# Patient Record
Sex: FEMALE | Race: BLACK OR AFRICAN AMERICAN | NOT HISPANIC OR LATINO | Employment: STUDENT | URBAN - METROPOLITAN AREA
[De-identification: names, ages, dates, MRNs, and addresses within clinical notes are randomized per-mention and may not be internally consistent; named-entity substitution may affect disease eponyms.]

---

## 2023-06-12 DIAGNOSIS — N39.498 OTHER SPECIFIED URINARY INCONTINENCE: Primary | ICD-10-CM

## 2023-06-19 ENCOUNTER — CLINICAL SUPPORT (OUTPATIENT)
Dept: REHABILITATION | Facility: OTHER | Age: 28
End: 2023-06-19
Attending: OBSTETRICS & GYNECOLOGY
Payer: OTHER GOVERNMENT

## 2023-06-19 DIAGNOSIS — N39.498 OTHER SPECIFIED URINARY INCONTINENCE: ICD-10-CM

## 2023-06-19 DIAGNOSIS — R27.8 COORDINATION IMPAIRMENT: ICD-10-CM

## 2023-06-19 DIAGNOSIS — R10.30 LOWER ABDOMINAL PAIN: ICD-10-CM

## 2023-06-19 DIAGNOSIS — M62.89 PELVIC FLOOR DYSFUNCTION: ICD-10-CM

## 2023-06-19 DIAGNOSIS — R10.2 PELVIC PAIN: ICD-10-CM

## 2023-06-19 PROCEDURE — 97140 MANUAL THERAPY 1/> REGIONS: CPT

## 2023-06-19 PROCEDURE — 97112 NEUROMUSCULAR REEDUCATION: CPT

## 2023-06-19 PROCEDURE — 97530 THERAPEUTIC ACTIVITIES: CPT

## 2023-06-19 PROCEDURE — 97162 PT EVAL MOD COMPLEX 30 MIN: CPT

## 2023-06-27 PROBLEM — R27.8 COORDINATION IMPAIRMENT: Status: ACTIVE | Noted: 2023-06-27

## 2023-06-27 PROBLEM — M62.89 PELVIC FLOOR DYSFUNCTION: Status: ACTIVE | Noted: 2023-06-27

## 2023-06-27 PROBLEM — R10.2 PELVIC PAIN: Status: ACTIVE | Noted: 2023-06-27

## 2023-06-27 PROBLEM — R10.30 LOWER ABDOMINAL PAIN: Status: ACTIVE | Noted: 2023-06-27

## 2023-06-27 NOTE — PATIENT INSTRUCTIONS
Home Exercise Program: 06/27/2023     SCAR MOBILIZATION  - Gently massage your scar in all directions both superficially along the skin and deeply.   - Massage the area AROUND your scar, as well as directly on it.   - Do not pull your scar apart with both hands - Use one hand/finger to anchor and use the other to pull along or across the scar.  - Apply only as much pressure as you can tolerate, so that you can do this again the next day.     Do for 5 minutes every day.          Inhale: Repeat 2 minutes, 2 times per day ; hold for now   Exhale: hold for now

## 2023-06-27 NOTE — PLAN OF CARE
[facility]  Pelvic Health Physical Therapy Initial Evaluation    Date: 2023   Name: Owen Rock  Clinic Number: [id number]  Therapy Diagnosis:   Encounter Diagnoses   Name Primary?    Other specified urinary incontinence     Pelvic floor dysfunction     Lower abdominal pain     Pelvic pain     Coordination impairment      Physician: Anette Alcocer MD    Physician Orders: PT Eval and Treat - Pelvic Floor PT   Medical Diagnosis from Referral: N39.498 (ICD-10-CM) - Other specified urinary incontinence  Evaluation Date: 2023  Authorization Period Expiration: 2023  Plan of Care Expiration: 2023  Visit # / Visits authorized:     Time In: 1605  Time Out: 1715  Total Appointment Time (timed & untimed codes): 70 minutes    Precautions: universal    Subjective     Date of onset:     History of current condition - Owen reports: PER with coughing, laughing, sneezing. She also feels like she is constantly leaking with movement. Often feels with standing up, sitting, and walking. Occasionally also experiences UUI, but only when she is busy and has held urine for a while.   Has experienced pain with intercourse since delivery of her son. Had an emergency  on 2021. Experiences pain most with deeper penetration when she is laying on her back and when she is laying on her stomach unless she supports herself with a pillow.   Has sporadic issues with constipation since son's delivery. Has had issues with hemorrhoids; most recent were in January. Was resolved with fiber use, though she is no longer taking.     OB/GYN History:  Caesarean  Sexually active? Yes  Pain with vaginal exams, intercourse or tampon use? Yes    Bladder/Bowel History:   Frequency of urination:   Daytime: varies every 15 minutes - every 4 hours  Nighttime: 0-1  Difficulty initiating urine stream: Yes  Urine stream: consistent   Complete emptying: Yes  Bladder leakage: Yes  Frequency of  incidents: daily   Amount leaked (urine): varies   Urinary Urgency: Yes  Able to delay the urge for at least 30  minute(s).  Frequency of bowel movements: once a day  Difficulty initiating BM: Yes  Quality/Shape of BM: [place] Stool Chart Type 2-5   Does Patient Feel Empty after BM? Most often   Fiber Supplements or Laxative Use?  Not regularly  Colon leakage: No  Form of protection: pads  Number of pads required in 24 hours: 0-1    PAIN:  Location: vaginal   Current 0/10, worst 4/10, best 0/10   Description: Deep and Sharp  Aggravating Factors/Activities that cause symptoms: intercourse    Easing Factors: rest     Medical History: Owen  has no past medical history on file.     Surgical History:  Owen Rock  has a past surgical history that includes right ear surgery and External ear surgery (2011).    Medications: Owen has a current medication list which includes the following prescription(s): fluticasone propionate and medroxyprogesterone.    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  Prior Level of Function: independent   Current Level of Function: leakage interrupts daily activities     Types of fluid intake: 32 oz of water, daily coffee, daily bottle of sprite   Diet: no specifics provided    Fiber: irregular supplementation   Habitus:well developed, well nourished  Abuse/Neglect: No     Pts goals: to enjoy sex, improve bladder control     OBJECTIVE     See EMR under MEDIA for written consent provided 6/19/2023  Chaperone: declined    ORTHO SCREEN  Posture in sitting: slouched  and sacral sitting  Posture in standing: forward and rounded shoulders , increased  kyphosis, and neutral pelvis   Pelvic alignment: no sign of deviations noted in supine   SI Joint Palpation: Denies tenderness to SI joint palpation bilaterally.    BACK ROM: WFL      Special Tests for Load Transfer Assessment Between the Trunk and Lower Extremities:    Active Straight Leg Test:    Right: pelvic rotation or drop    Left: pelvic rotation or drop    Active Straight Leg Test with Overpressure:   Right: breath holding and pelvic rotation or drop    Left: breath holding and pelvic rotation or drop    Single Leg Stance Test:  WNL bilaterally     ABDOMINAL WALL ASSESSMENT  Palpation: tender and increased tension   Abdominal strength: Rectus abdominus: 4+/5     Transverse abdominus: 4/5  Scarring: transverse lower abdominal scarring - hypersensitive, limited mobility left>right   Pelvic Floor Muscle and Transverse Abdominus Synergy: present  Diastasis: absent     BREATHING MECHANICS ASSESSMENT   Thorax Assessment During Quiet Respiration: WNL excursion of ribcage and Decreased excursion of abdominal wall   Thorax Assessment During Deep Respiration: Decreased excursion of abdominal wall  and Excessive excursion of sternum    VAGINAL PELVIC FLOOR EXAM - NP this session    Limitation/Restriction for FOTO Pelvic Floor Survey    Therapist reviewed FOTO scores for Owen Rock on 2023.   FOTO documents entered into [employer] - see Media section.    Limitation Score:          TREATMENT     Treatment Time In: 1635  Treatment Time Out: 1715  Total Treatment time (time-based codes) separate from Evaluation: 40 minutes    Neuromuscular Re-education to develop Coordination, Control, and Down training for 10 minutes including: pelvic floor relaxation/bulging training, 360 breathing , and diaphragmatic breathing    Manual Therapy to develop extensibility and desensitization for 10 minutes including: scar mobilization of  scarring    Therapeutic Activity Patient participated in dynamic functional therapeutic  activities to improve functional performance for 20 minutes. Including: Education as described below.     Patient Education provided:   general anatomy/physiology of urinary/ bowel  system, benefits of treatment, risks of treatment, and alternative methods of treatment were discussed with the pt. Additionally, bladder irritants, anatomy/physiology of pelvic floor, posture/body mechanices, bladder retraining, diaphragmatic breathing, fluid intake/dietary modifications, and behavior modifications were reviewed.     Home Exercises Provided:  Written Home Exercises Provided: yes.  Exercises were reviewed and Owen was able to demonstrate them prior to the end of the session.    Owen demonstrated good  understanding of the education provided.     See EMR under Patient Instructions for exercises provided 6/19/2023.    Assessment     Owen is a 28 y.o. female referred to outpatient Physical Therapy with a medical diagnosis of urinary incontinence. Pt presents with fair posture and trunk mobility, though most limitation noted in abdominopelvic region. Owen displays major restriction of caesarian scar mobility as well as significant hypersensitivity. Based on reproduced pain, myofascial restriction is likely contributing to feelings of bladder pain and urgency when filling. Pelvic floor muscles not fully assessed this session, but based on reported symptoms and observations at abdominal wall, significant limitations in abdominopelvic muscle balance, coordination, and control are likely contributing to poor pressure management and bladder dysfunction. Based on presentation, Owen would benefit from continued pelvic floor physical therapy for improved abdominopelvic coordination.    Pt prognosis is Good.   Pt will benefit from skilled outpatient Physical Therapy to address the deficits stated above and in the chart below, provide pt/family education, and to maximize pt's level of independence.     Plan of care discussed  with patient: Yes  Pt's spiritual, cultural and educational needs considered and patient is agreeable to the plan of care and goals as stated below:     Anticipated Barriers for therapy: scheduling limitations, chronicity of condition     Medical Necessity is demonstrated by the following    History  Co-morbidities and personal factors that may impact the plan of care Co-morbidities:   None on file     Personal Factors:   lifestyle     moderate   Examination  Body Structures and Functions, activity limitations and participation restrictions that may impact the plan of care Body Regions/Systems/Functions:  altered posture, poor knowledge of body mechanics and posture, adhered abdominal scar, poor trunk stability, pelvic floor tenderness, decreased phasic ability of the pelvic muscles, poor quality of pelvic muscle contraction, poor coordination of pelvic floor muscles during ADL's leading to urinary or fecal leakage, dysfunctional voiding, and dysfunctional defecation     Activity Limitations:  urgency , initiating a BM, Initiating urine stream, full bladder emptying, incontinence with ADLs, and Pain with ADLs    Participation Restrictions:  all ADLs/iADLs uninterrupted by urinary incontinence/urgency/frequency, relationship with spouse/partner, ADL participation affected by pain, work duties, and exercise restrictions due to incontinence     Activity limitations:   Learning and applying knowledge  no deficits    General Tasks and Commands  no deficits    Communication  no deficits    Mobility  no deficits    Self care  toileting    Domestic Life  no deficits    Interactions/Relationships  intimate relationships    Life Areas  employment    Community and Social Life  community life  recreation and leisure       moderate   Clinical Presentation evolving clinical presentation with changing clinical characteristics moderate   Decision Making/ Complexity Score: moderate       Goals:  Short Term Goals: 6 weeks   Pt to be  edu pelvic muscle bracing and be able to consistently perform correctly and quickly to help decrease incontinence with cough/laugh/sneeze.  Pt to demonstrate being able to correctly and consistently perform a kegel which is needed  to increase pelvic floor muscle coordination and strength needed for continence.  Pt to report being able to transfer from sitting to standing without leakage of urine.  Pt to voice understanding of the role that diet plays on urinary urgency.    Pt to demonstrate proper diaphragmatic breathing to help with calming the nervous system in order to decrease pain and to improve abdominal wall musculature extensibility.   Pt to report being able to complete a half day at work without significant increase in pain to demonstrate a return towards prior level of function.  Pt to report minimal pain with palpation of abdominal wall due to improvement in soft tissue mobility from moderate to minimal restrictions.  Pt to demonstrate a decrease in scar restrictions to no more than minimal restrictions needed to help decrease pain with functional mobility.  Pt will report minimal to no pain with single digit pelvic assessment and display relaxation demonstrating improving pelvic floor muscle relaxation and coordination.     Long Term Goals: 12 weeks   Pt to be discharged with home plan for carry over after discharge.    Pt to report elimination of incontinence with ADLs to demonstrate improved pelvic floor muscle strength and coordination.  Pt will be trained and compliant with postural strategies in sitting and standing to improve alignment and decrease pain and muscle fatigue  Pt to report being able to complete a full day at work without significant increase in pain to demonstrate a return towards prior level of function.  Pt to report being able to have a comfortable vaginal exam without significant increase in pelvic pain.  Pt to increase abdominal wall strength by at least 1 muscle grade to  improve lumbopelvic stability with ADLs that would normally increase pain.  Pt to report a decrease in pain to no more than 1/10 at it's worst with vaginal intercourse.    Pt to report being able to sneeze without incontinence demonstrating improved pelvic floor strength and coordination to improve confidence in social situations.   Pt to report being able to have a spontaneous bowel movement at least once every 2 days to demonstrate improving gut motility and pelvic floor coordination.       Plan     Plan of care Certification: 6/19/2023 to 9/19/2023.    Outpatient Physical Therapy 1 times weekly for 12 weeks to include the following interventions: therapeutic exercises, therapeutic activity, neuromuscular re-education, manual therapy, modalities PRN, patient/family education and self care/home management    Plan for next visit: pelvic floor muscle assessment     ELIZABETH GARCIA, PT  6/19/2023        I have seen the patient, reviewed the therapist's plan of care, and I agree with the plan of care.      I certify the need for these services furnished under this plan of treatment and while under my care.    ___________________ ________ Physician/Referring Practitioner            ___________________________ Date of Signature

## 2023-08-01 ENCOUNTER — CLINICAL SUPPORT (OUTPATIENT)
Dept: REHABILITATION | Facility: OTHER | Age: 28
End: 2023-08-01
Payer: OTHER GOVERNMENT

## 2023-08-01 DIAGNOSIS — R10.2 PELVIC PAIN: ICD-10-CM

## 2023-08-01 DIAGNOSIS — R10.30 LOWER ABDOMINAL PAIN: ICD-10-CM

## 2023-08-01 DIAGNOSIS — R27.8 COORDINATION IMPAIRMENT: ICD-10-CM

## 2023-08-01 DIAGNOSIS — M62.89 PELVIC FLOOR DYSFUNCTION: Primary | ICD-10-CM

## 2023-08-01 PROCEDURE — 97140 MANUAL THERAPY 1/> REGIONS: CPT

## 2023-08-01 PROCEDURE — 97530 THERAPEUTIC ACTIVITIES: CPT

## 2023-08-01 PROCEDURE — 97112 NEUROMUSCULAR REEDUCATION: CPT

## 2023-08-01 NOTE — PROGRESS NOTES
Pelvic Health Physical Therapy   Treatment Note     Name: Owen Rock [facility] Number: [id number]    Therapy Diagnosis:   Encounter Diagnoses   Name Primary?    Pelvic floor dysfunction Yes    Lower abdominal pain     Pelvic pain     Coordination impairment      Physician: Anette Alcocer MD    Visit Date: 8/1/2023    Physician Orders: PT Eval and Treat - Pelvic Floor PT   Medical Diagnosis from Referral: N39.498 (ICD-10-CM) - Other specified urinary incontinence  Evaluation Date: 6/19/2023  Authorization Period Expiration: 9/26/2023  Plan of Care Expiration: 9/19/2023  Visit # / Visits authorized: 1/ 14    Cancelled Visits: 2  No Show Visits: 0    Time In: 1615  Time Out: 1715  Total Billable Time: 60 minutes    Precautions: Standard    Subjective     Pt reports: has been performing scar massage in the shower. Noticed increased sensitivity today as well as increased leakage throughout the day.    She was compliant with home exercise program.  Response to previous treatment: Good  Functional change: Ongoing     Pain: 5/10  Location:  lower abdomen      Objective     FOTO 1/3 - Most recently administered 6/19/2023    Owen received therapeutic exercises to develop  strength, endurance, ROM, flexibility, posture, and core stabilization for 00 minutes including:     Owen received the following manual therapy techniques: to develop flexibility, extensibility, and desensitization for 40 minutes including: trigger point/myofascial release of Abdominal aponeurosis, endopelvic fascia, abdominals, soft tissue mobilization of lower abdomen, and scar mobilization of caesarean section scar      Owen participated in neuromuscular re-education activities to develop Coordination, Control, and Down training for 10 minutes including: pelvic floor relaxation/bulging training and 360 breathing     Diaphragmatic breathing review   + pelvic floor muscle, glute, abdominal relaxation     Owen participated in  dynamic functional therapeutic activities to improve functional performance for 10 minutes, including:    Implementation, positioning for scar massage       Home Exercises Provided and Patient Education Provided     Education provided:   - anatomy/physiology of pelvic floor, posture/body mechanices, diaphragmatic breathing, and behavior modifications  Discussed progression of plan of care with patient; educated pt in activity modification; reviewed HEP with pt. Pt demonstrated and verbalized understanding of all instruction and was provided with a handout of HEP (see Patient Instructions).    Written Home Exercises Provided: Patient instructed to cont prior HEP.  Exercises were reviewed and Owen was able to demonstrate them prior to the end of the session.  Owen demonstrated good  understanding of the education provided.     See EMR under Patient Instructions for exercises provided 8/1/2023.    Assessment     Abdominal mobilization continued this session, as Owen presented with increased sensitivity. Upon superior mobilization, referral of tension and tenderness to pubic region and bilateral labia noted. Based on referral, more distal mobilization initiated this session. Significant improvement noted at completion of session, with reduction of pain from 5/10 pretreatment  to 1/10 post. Based on performance, Owen would benefit from continued regular performance.     Owen Is progressing well towards her goals.   Pt prognosis is Good.     Pt will continue to benefit from skilled outpatient physical therapy to address the deficits listed in the problem list box on initial evaluation, provide pt/family education and to maximize pt's level of independence in the home and community environment.     Pt's spiritual, cultural and educational needs considered and pt agreeable to plan of care and goals.     Anticipated barriers to physical therapy: scheduling limitations, chronicity of condition    Goals:   Short Term  Goals: 6 weeks - progressing  Pt to be edu pelvic muscle bracing and be able to consistently perform correctly and quickly to help decrease incontinence with cough/laugh/sneeze.  Pt to demonstrate being able to correctly and consistently perform a kegel which is needed  to increase pelvic floor muscle coordination and strength needed for continence.  Pt to report being able to transfer from sitting to standing without leakage of urine.  Pt to voice understanding of the role that diet plays on urinary urgency.    Pt to demonstrate proper diaphragmatic breathing to help with calming the nervous system in order to decrease pain and to improve abdominal wall musculature extensibility.   Pt to report being able to complete a half day at work without significant increase in pain to demonstrate a return towards prior level of function.  Pt to report minimal pain with palpation of abdominal wall due to improvement in soft tissue mobility from moderate to minimal restrictions.  Pt to demonstrate a decrease in scar restrictions to no more than minimal restrictions needed to help decrease pain with functional mobility.  Pt will report minimal to no pain with single digit pelvic assessment and display relaxation demonstrating improving pelvic floor muscle relaxation and coordination.      Long Term Goals: 12 weeks - progressing  Pt to be discharged with home plan for carry over after discharge.    Pt to report elimination of incontinence with ADLs to demonstrate improved pelvic floor muscle strength and coordination.  Pt will be trained and compliant with postural strategies in sitting and standing to improve alignment and decrease pain and muscle fatigue  Pt to report being able to complete a full day at work without significant increase in pain to demonstrate a return towards prior level of function.  Pt to report being able to have a comfortable vaginal exam without significant increase in pelvic pain.  Pt to increase  abdominal wall strength by at least 1 muscle grade to improve lumbopelvic stability with ADLs that would normally increase pain.  Pt to report a decrease in pain to no more than 1/10 at it's worst with vaginal intercourse.    Pt to report being able to sneeze without incontinence demonstrating improved pelvic floor strength and coordination to improve confidence in social situations.   Pt to report being able to have a spontaneous bowel movement at least once every 2 days to demonstrate improving gut motility and pelvic floor coordination.     Plan     Plan for next visit: abdominal MFR, pelvic floor muscle assessment     ELIZABETH GARCIA, PT   08/02/2023

## 2023-08-16 ENCOUNTER — CLINICAL SUPPORT (OUTPATIENT)
Dept: REHABILITATION | Facility: OTHER | Age: 28
End: 2023-08-16
Payer: OTHER GOVERNMENT

## 2023-08-16 DIAGNOSIS — R27.8 COORDINATION IMPAIRMENT: ICD-10-CM

## 2023-08-16 DIAGNOSIS — R10.30 LOWER ABDOMINAL PAIN: ICD-10-CM

## 2023-08-16 DIAGNOSIS — R10.2 PELVIC PAIN: ICD-10-CM

## 2023-08-16 DIAGNOSIS — M62.89 PELVIC FLOOR DYSFUNCTION: Primary | ICD-10-CM

## 2023-08-16 PROCEDURE — 97112 NEUROMUSCULAR REEDUCATION: CPT

## 2023-08-16 PROCEDURE — 97140 MANUAL THERAPY 1/> REGIONS: CPT

## 2023-08-16 NOTE — PROGRESS NOTES
Pelvic Health Physical Therapy   Treatment Note     Name: Owen FLORES  Clinic Number: [id number]    Therapy Diagnosis:   Encounter Diagnoses   Name Primary?    Pelvic floor dysfunction Yes    Lower abdominal pain     Pelvic pain     Coordination impairment        Physician: Anette Alcocer MD    Visit Date: 8/16/2023    Physician Orders: PT Eval and Treat - Pelvic Floor PT   Medical Diagnosis from Referral: N39.498 (ICD-10-CM) - Other specified urinary incontinence  Evaluation Date: 6/19/2023  Authorization Period Expiration: 9/26/2023  Plan of Care Expiration: 9/19/2023  Visit # / Visits authorized: 2/ 14    Cancelled Visits: 4  No Show Visits: 0    Time In: 1300  Time Out: 1400  Total Billable Time: 60 minutes    Precautions: Standard    Subjective     Pt reports: symptoms remain approximately the same, though scar has been feelings slightly less sensitive    She was compliant with home exercise program.  Response to previous treatment: Good  Functional change: decreased scar sensitivity     Pain: 0/10  Location:  lower abdomen      Objective     FOTO 1/3 - Most recently administered 6/19/2023    VAGINAL PELVIC FLOOR EXAM    EXTERNAL ASSESSMENT  Introitus: WNL  Skin condition: WNL  Scarring: none   Sensation: WNL   Pain:  none  Voluntary contraction: visible lift  Voluntary relaxation: visible drop  Involuntary contraction: reflex tightening        INTERNAL ASSESSMENT  Pain: tender areas noted as follows: posterior pelvic floor, periurethral tissue    Sensation: able to localized pressure appropriately   Vaginal vault: WNL   Muscle Bulk: slightly increased tone  Muscle Power: 2/5   Muscle Endurance: >3 sec  # Reps To Fatigue: 2 due to incomplete relaxation     Fast Contractions in 10 seconds: 3     Quality of contraction: slow rise and incomplete relaxation   Specificity: patient contracts: abdominals    Coordination: cannot isolate PFM from abdominals       Owen received therapeutic exercises  to develop  strength, endurance, ROM, flexibility, posture, and core stabilization for 00 minutes including:     Owen received the following manual therapy techniques: to develop flexibility, extensibility, and desensitization for 15 minutes including: trigger point/myofascial release of Abdominal aponeurosis, endopelvic fascia, abdominals, soft tissue mobilization of lower abdomen, and scar mobilization of caesarean section scar    Caesarean section scar massage, MFR lower abdominal wall     MFR pelvic floor muscle - internal     Owen participated in neuromuscular re-education activities to develop Coordination, Control, and Down training for 40 minutes including: pelvic floor relaxation/bulging training and 360 breathing     Diaphragmatic breathing review + pelvic floor muscle mobility    + glute, adductor co-contraction     Pelvic floor muscle elevators    Emphasis on complete relaxation     Owen participated in dynamic functional therapeutic activities to improve functional performance for 5 minutes, including:    Urge delay techniques     Home Exercises Provided and Patient Education Provided     Education provided:   - anatomy/physiology of pelvic floor, posture/body mechanices, diaphragmatic breathing, and behavior modifications  Discussed progression of plan of care with patient; educated pt in activity modification; reviewed HEP with pt. Pt demonstrated and verbalized understanding of all instruction and was provided with a handout of HEP (see Patient Instructions).    Written Home Exercises Provided: Patient instructed to cont prior HEP.  Exercises were reviewed and Owen was able to demonstrate them prior to the end of the session.  Owen demonstrated good  understanding of the education provided.     See EMR under Patient Instructions for exercises provided 8/1/2023.    Assessment     Pelvic floor muscle assessment performed this session, with Owen demonstrating slight increase in pelvic floor  muscle tension and limited mobility. Significant weakness noted, though likely influenced by increased resting tension and incomplete relaxation. Limited coordination with abdominals noted, though fair coordination with hips demonstrated. Based on performance, home exercise program updated to include pelvic floor muscle elevators with increased focus on relaxation. Plan to reassess utility next visit.    Owen Is progressing well towards her goals.   Pt prognosis is Good.     Pt will continue to benefit from skilled outpatient physical therapy to address the deficits listed in the problem list box on initial evaluation, provide pt/family education and to maximize pt's level of independence in the home and community environment.     Pt's spiritual, cultural and educational needs considered and pt agreeable to plan of care and goals.     Anticipated barriers to physical therapy: scheduling limitations, chronicity of condition    Goals:   Short Term Goals: 6 weeks - progressing  Pt to be edu pelvic muscle bracing and be able to consistently perform correctly and quickly to help decrease incontinence with cough/laugh/sneeze.  Pt to demonstrate being able to correctly and consistently perform a kegel which is needed  to increase pelvic floor muscle coordination and strength needed for continence.  Pt to report being able to transfer from sitting to standing without leakage of urine.  Pt to voice understanding of the role that diet plays on urinary urgency.    Pt to demonstrate proper diaphragmatic breathing to help with calming the nervous system in order to decrease pain and to improve abdominal wall musculature extensibility.   Pt to report being able to complete a half day at work without significant increase in pain to demonstrate a return towards prior level of function.  Pt to report minimal pain with palpation of abdominal wall due to improvement in soft tissue mobility from moderate to minimal restrictions.  Pt  to demonstrate a decrease in scar restrictions to no more than minimal restrictions needed to help decrease pain with functional mobility.  Pt will report minimal to no pain with single digit pelvic assessment and display relaxation demonstrating improving pelvic floor muscle relaxation and coordination.      Long Term Goals: 12 weeks - progressing  Pt to be discharged with home plan for carry over after discharge.    Pt to report elimination of incontinence with ADLs to demonstrate improved pelvic floor muscle strength and coordination.  Pt will be trained and compliant with postural strategies in sitting and standing to improve alignment and decrease pain and muscle fatigue  Pt to report being able to complete a full day at work without significant increase in pain to demonstrate a return towards prior level of function.  Pt to report being able to have a comfortable vaginal exam without significant increase in pelvic pain.  Pt to increase abdominal wall strength by at least 1 muscle grade to improve lumbopelvic stability with ADLs that would normally increase pain.  Pt to report a decrease in pain to no more than 1/10 at it's worst with vaginal intercourse.    Pt to report being able to sneeze without incontinence demonstrating improved pelvic floor strength and coordination to improve confidence in social situations.   Pt to report being able to have a spontaneous bowel movement at least once every 2 days to demonstrate improving gut motility and pelvic floor coordination.     Plan     Plan for next visit: pelvic floor muscle relaxation/elevators     ELIZABETH GARCIA, PT   08/16/2023

## 2023-08-31 ENCOUNTER — CLINICAL SUPPORT (OUTPATIENT)
Dept: REHABILITATION | Facility: OTHER | Age: 28
End: 2023-08-31
Payer: OTHER GOVERNMENT

## 2023-08-31 DIAGNOSIS — R27.8 COORDINATION IMPAIRMENT: ICD-10-CM

## 2023-08-31 DIAGNOSIS — R10.2 PELVIC PAIN: ICD-10-CM

## 2023-08-31 DIAGNOSIS — M62.89 PELVIC FLOOR DYSFUNCTION: Primary | ICD-10-CM

## 2023-08-31 DIAGNOSIS — R10.30 LOWER ABDOMINAL PAIN: ICD-10-CM

## 2023-08-31 PROCEDURE — 97530 THERAPEUTIC ACTIVITIES: CPT

## 2023-08-31 PROCEDURE — 97112 NEUROMUSCULAR REEDUCATION: CPT

## 2023-08-31 NOTE — PROGRESS NOTES
Pelvic Health Physical Therapy   Treatment Note     Name: Owen Rock [facility] Number: [id number]    Therapy Diagnosis:   Encounter Diagnoses   Name Primary?    Pelvic floor dysfunction Yes    Lower abdominal pain     Pelvic pain     Coordination impairment          Physician: Anette Alcocer MD    Visit Date: 8/31/2023    Physician Orders: PT Eval and Treat - Pelvic Floor PT   Medical Diagnosis from Referral: N39.498 (ICD-10-CM) - Other specified urinary incontinence  Evaluation Date: 6/19/2023  Authorization Period Expiration: 9/26/2023  Plan of Care Expiration: 9/19/2023  Visit # / Visits authorized: 3/ 14    Cancelled Visits: 4  No Show Visits: 0    Time In: 1300  Time Out: 1350  Total Billable Time: 50 minutes    Precautions: Standard    Subjective     Pt reports: Pain has been manageable. Has not had any days of increased scar sensitivity. Feels like urgency has continued but leakage has decreased slightly.     She was compliant with home exercise program.  Response to previous treatment: Good  Functional change: decreased scar sensitivity     Pain: 0/10  Location:  lower abdomen      Objective     FOTO 1/3 - Most recently administered 6/19/2023      Owen received therapeutic exercises to develop  strength, endurance, ROM, flexibility, posture, and core stabilization for 00 minutes including:     Owen received the following manual therapy techniques: to develop flexibility, extensibility, and desensitization for 00 minutes including: trigger point/myofascial release of Abdominal aponeurosis, endopelvic fascia, abdominals, soft tissue mobilization of lower abdomen, and scar mobilization of caesarean section scar    Caesarean section scar massage, MFR lower abdominal wall     MFR pelvic floor muscle - internal     Owen participated in neuromuscular re-education activities to develop Coordination, Control, and Down training for 40 minutes including: pelvic floor relaxation/bulging training  and 360 breathing     Diaphragmatic breathing review + pelvic floor muscle mobility    In supine, + hip external rotation, + internal rotation, progressive hip flexion > extension     Pelvic floor muscle elevators    Emphasis on complete relaxation    + cat/cow    + quadruped rocks    + hip circles     Owen participated in dynamic functional therapeutic activities to improve functional performance for 10 minutes, including:    Review of independent abdominal massage     Home Exercises Provided and Patient Education Provided     Education provided:   - anatomy/physiology of pelvic floor, posture/body mechanices, diaphragmatic breathing, and behavior modifications  Discussed progression of plan of care with patient; educated pt in activity modification; reviewed HEP with pt. Pt demonstrated and verbalized understanding of all instruction and was provided with a handout of HEP (see Patient Instructions).    Written Home Exercises Provided: Patient instructed to cont prior HEP.  Exercises were reviewed and Owen was able to demonstrate them prior to the end of the session.  Owen demonstrated good  understanding of the education provided.     See EMR under Patient Instructions for exercises provided 8/1/2023.    Assessment     Pelvic floor mobility reviewed and progressed this session, with Owen demonstrating improvement pelvic floor muscle awareness. Despite this, she continues with feelings of incomplete relaxation. Plan to reassess manually next visit.     Owen Is progressing well towards her goals.   Pt prognosis is Good.     Pt will continue to benefit from skilled outpatient physical therapy to address the deficits listed in the problem list box on initial evaluation, provide pt/family education and to maximize pt's level of independence in the home and community environment.     Pt's spiritual, cultural and educational needs considered and pt agreeable to plan of care and goals.     Anticipated barriers  to physical therapy: scheduling limitations, chronicity of condition    Goals:   Short Term Goals: 6 weeks - progressing  Pt to be edu pelvic muscle bracing and be able to consistently perform correctly and quickly to help decrease incontinence with cough/laugh/sneeze.  Pt to demonstrate being able to correctly and consistently perform a kegel which is needed  to increase pelvic floor muscle coordination and strength needed for continence.  Pt to report being able to transfer from sitting to standing without leakage of urine.  Pt to voice understanding of the role that diet plays on urinary urgency.    Pt to demonstrate proper diaphragmatic breathing to help with calming the nervous system in order to decrease pain and to improve abdominal wall musculature extensibility.   Pt to report being able to complete a half day at work without significant increase in pain to demonstrate a return towards prior level of function.  Pt to report minimal pain with palpation of abdominal wall due to improvement in soft tissue mobility from moderate to minimal restrictions.  Pt to demonstrate a decrease in scar restrictions to no more than minimal restrictions needed to help decrease pain with functional mobility.  Pt will report minimal to no pain with single digit pelvic assessment and display relaxation demonstrating improving pelvic floor muscle relaxation and coordination.      Long Term Goals: 12 weeks - progressing  Pt to be discharged with home plan for carry over after discharge.    Pt to report elimination of incontinence with ADLs to demonstrate improved pelvic floor muscle strength and coordination.  Pt will be trained and compliant with postural strategies in sitting and standing to improve alignment and decrease pain and muscle fatigue  Pt to report being able to complete a full day at work without significant increase in pain to demonstrate a return towards prior level of function.  Pt to report being able to have a  comfortable vaginal exam without significant increase in pelvic pain.  Pt to increase abdominal wall strength by at least 1 muscle grade to improve lumbopelvic stability with ADLs that would normally increase pain.  Pt to report a decrease in pain to no more than 1/10 at it's worst with vaginal intercourse.  Pt to report being able to sneeze without incontinence demonstrating improved pelvic floor strength and coordination to improve confidence in social situations.   Pt to report being able to have a spontaneous bowel movement at least once every 2 days to demonstrate improving gut motility and pelvic floor coordination.     Plan     Plan for next visit: pelvic floor muscle relaxation/elevatorsEARLENE, PT   08/31/2023

## 2023-09-20 ENCOUNTER — CLINICAL SUPPORT (OUTPATIENT)
Dept: REHABILITATION | Facility: OTHER | Age: 28
End: 2023-09-20
Attending: OBSTETRICS & GYNECOLOGY
Payer: OTHER GOVERNMENT

## 2023-09-20 DIAGNOSIS — R10.30 LOWER ABDOMINAL PAIN: ICD-10-CM

## 2023-09-20 DIAGNOSIS — R27.8 COORDINATION IMPAIRMENT: ICD-10-CM

## 2023-09-20 DIAGNOSIS — M62.89 PELVIC FLOOR DYSFUNCTION: Primary | ICD-10-CM

## 2023-09-20 DIAGNOSIS — R10.2 PELVIC PAIN: ICD-10-CM

## 2023-09-20 PROCEDURE — 97112 NEUROMUSCULAR REEDUCATION: CPT

## 2023-09-20 PROCEDURE — 97140 MANUAL THERAPY 1/> REGIONS: CPT

## 2023-09-20 NOTE — PROGRESS NOTES
Pelvic Health Physical Therapy   Treatment Note     Name: Owen Rock [place]  Clinic Number: [id number]    Therapy Diagnosis:   Encounter Diagnoses   Name Primary?    Pelvic floor dysfunction Yes    Lower abdominal pain     Pelvic pain     Coordination impairment            Physician: Anette Alcocer MD    Visit Date: 9/20/2023    Physician Orders: PT Eval and Treat - Pelvic Floor PT   Medical Diagnosis from Referral: N39.498 (ICD-10-CM) - Other specified urinary incontinence  Evaluation Date: 6/19/2023  Authorization Period Expiration: 9/26/2023  Plan of Care Expiration: 9/19/2023 - Extend to 12/20/2023  Visit # / Visits authorized: 4/ 14    Cancelled Visits: 4  No Show Visits: 0    Time In: 1313  Time Out: 1400  Total Billable Time: 47 minutes    Precautions: Standard    Subjective     Pt reports: Has been stressed due to issues with her apartment and relocating to temporary housing. Because of this she has not been using home exercise program     She was not compliant with home exercise program.  Response to previous treatment: Good  Functional change: decreased scar sensitivity     Pain: 0/10  Location:  lower abdomen      Objective     FOTO 2/3 - Most recently administered 9/20/2023      Owen received therapeutic exercises to develop  strength, endurance, ROM, flexibility, posture, and core stabilization for 00 minutes including:     Owen received the following manual therapy techniques: to develop flexibility, extensibility, and desensitization for 30 minutes including: trigger point/myofascial release of Abdominal aponeurosis, endopelvic fascia, abdominals, soft tissue mobilization of lower abdomen, and scar mobilization of caesarean section scar    Caesarean section scar massage, MFR lower abdominal wall     Dry needling to caesarean section scar   See EMR under MEDIA for written consent provided.  4x30mm inserted at caesarean section scar in supine position.      Owen participated in  neuromuscular re-education activities to develop Coordination, Control, and Down training for 17 minutes including: pelvic floor relaxation/bulging training and 360 breathing     Diaphragmatic breathing review + pelvic floor muscle mobility    In supine    Owen participated in dynamic functional therapeutic activities to improve functional performance for 00 minutes, including:    Review of independent abdominal massage     Home Exercises Provided and Patient Education Provided     Education provided:   - anatomy/physiology of pelvic floor, posture/body mechanices, diaphragmatic breathing, and behavior modifications  Discussed progression of plan of care with patient; educated pt in activity modification; reviewed HEP with pt. Pt demonstrated and verbalized understanding of all instruction and was provided with a handout of HEP (see Patient Instructions).    Written Home Exercises Provided: Patient instructed to cont prior HEP.  Exercises were reviewed and Owen was able to demonstrate them prior to the end of the session.  Owen demonstrated good  understanding of the education provided.     See EMR under Patient Instructions for exercises provided 8/1/2023.    Assessment     Abdominal scar mobilized this session using manual therapy and dry needling for decreased sensitivity and restriction at urethra and bladder. Patient provided written and verbal consent to receive functional dry needling at today's visit (see consent form scanned into chart). FDN performed to caesarean section scar. FDN performed to reduce pain, promote blood flow, and improve ROM and function. Pt tolerated tx well without adverse effects. Owen was educated on what to expect following the procedure and verbalized understanding. Significant improvement in tissue mobility following completion as noted with stretching in prone.       Owen Is progressing well towards her goals.   Pt prognosis is Good.     Pt will continue to benefit from  skilled outpatient physical therapy to address the deficits listed in the problem list box on initial evaluation, provide pt/family education and to maximize pt's level of independence in the home and community environment.     Pt's spiritual, cultural and educational needs considered and pt agreeable to plan of care and goals.     Anticipated barriers to physical therapy: scheduling limitations, chronicity of condition    Goals:   Short Term Goals: 6 weeks - progressing  Pt to be edu pelvic muscle bracing and be able to consistently perform correctly and quickly to help decrease incontinence with cough/laugh/sneeze.  Pt to demonstrate being able to correctly and consistently perform a kegel which is needed  to increase pelvic floor muscle coordination and strength needed for continence.  Pt to report being able to transfer from sitting to standing without leakage of urine.  Pt to voice understanding of the role that diet plays on urinary urgency.    Pt to demonstrate proper diaphragmatic breathing to help with calming the nervous system in order to decrease pain and to improve abdominal wall musculature extensibility.   Pt to report being able to complete a half day at work without significant increase in pain to demonstrate a return towards prior level of function.  Pt to report minimal pain with palpation of abdominal wall due to improvement in soft tissue mobility from moderate to minimal restrictions.  Pt to demonstrate a decrease in scar restrictions to no more than minimal restrictions needed to help decrease pain with functional mobility.  Pt will report minimal to no pain with single digit pelvic assessment and display relaxation demonstrating improving pelvic floor muscle relaxation and coordination.      Long Term Goals: 12 weeks - progressing  Pt to be discharged with home plan for carry over after discharge.    Pt to report elimination of incontinence with ADLs to demonstrate improved pelvic floor  muscle strength and coordination.  Pt will be trained and compliant with postural strategies in sitting and standing to improve alignment and decrease pain and muscle fatigue  Pt to report being able to complete a full day at work without significant increase in pain to demonstrate a return towards prior level of function.  Pt to report being able to have a comfortable vaginal exam without significant increase in pelvic pain.  Pt to increase abdominal wall strength by at least 1 muscle grade to improve lumbopelvic stability with ADLs that would normally increase pain.  Pt to report a decrease in pain to no more than 1/10 at it's worst with vaginal intercourse.  Pt to report being able to sneeze without incontinence demonstrating improved pelvic floor strength and coordination to improve confidence in social situations.   Pt to report being able to have a spontaneous bowel movement at least once every 2 days to demonstrate improving gut motility and pelvic floor coordination.     Plan     Plan for next visit: FOTO  Monitor response to Functional Dry Needling. Continue with Functional Dry Needling in POC as appropriate.     ELIZABETH GARCIA, PT   09/22/2023

## 2023-10-18 ENCOUNTER — CLINICAL SUPPORT (OUTPATIENT)
Dept: REHABILITATION | Facility: OTHER | Age: 28
End: 2023-10-18
Attending: OBSTETRICS & GYNECOLOGY
Payer: OTHER GOVERNMENT

## 2023-10-18 DIAGNOSIS — R10.2 PELVIC PAIN: ICD-10-CM

## 2023-10-18 DIAGNOSIS — R10.30 LOWER ABDOMINAL PAIN: ICD-10-CM

## 2023-10-18 DIAGNOSIS — M62.89 PELVIC FLOOR DYSFUNCTION: Primary | ICD-10-CM

## 2023-10-18 DIAGNOSIS — R27.8 COORDINATION IMPAIRMENT: ICD-10-CM

## 2023-10-18 PROCEDURE — 97112 NEUROMUSCULAR REEDUCATION: CPT

## 2023-10-18 PROCEDURE — 97140 MANUAL THERAPY 1/> REGIONS: CPT

## 2023-10-18 NOTE — PROGRESS NOTES
Pelvic Health Physical Therapy   Treatment Note     Name: Owen Rock Durham  Clinic Number: [id number]    Therapy Diagnosis:   Encounter Diagnoses   Name Primary?    Pelvic floor dysfunction Yes    Lower abdominal pain     Pelvic pain     Coordination impairment        Physician: Anette Alcocer MD    Visit Date: 10/18/2023    Physician Orders: PT Eval and Treat - Pelvic Floor PT   Medical Diagnosis from Referral: N39.498 (ICD-10-CM) - Other specified urinary incontinence  Evaluation Date: 6/19/2023  Authorization Period Expiration: 9/26/2023  Plan of Care Expiration: 9/19/2023 - Extend to 12/20/2023  Visit # / Visits authorized: 5/ 14    Cancelled Visits: 4  No Show Visits: 0    Time In: 1300  Time Out: 1355  Total Billable Time: 55 minutes    Precautions: Standard    Subjective     Pt reports: Decreased frequency of abdominal pains and sensitivity. Decreased pain with intercourse. However, she feels like frequency and volume of leakage has increased. Does not notice leakage as it is happening, but only notices moisture in clothing when she undresses to use the bathroom.     She was not compliant with home exercise program.  Response to previous treatment: Good  Functional change: decreased scar sensitivity     Pain: 0/10  Location:  lower abdomen      Objective     FOTO 3/3 - Most recently administered [id number]    VAGINAL PELVIC FLOOR EXAM      INTERNAL ASSESSMENT  Pain: tender areas noted as follows: levator ani    Sensation: able to localized pressure appropriately   Vaginal vault: stenotic   Muscle Bulk: increased tone  Muscle Power: 2/5    Quality of contraction: slow relaxation and incomplete relaxation   Specificity: WNL   Coordination: WNL       Owen received therapeutic exercises to develop  strength, endurance, ROM, flexibility, posture, and core stabilization for 00 minutes including:     Owen received the following manual therapy techniques: to develop flexibility, extensibility, and  desensitization for 10 minutes including: trigger point/myofascial release of PFM    Owen participated in neuromuscular re-education activities to develop Coordination, Control, and Down training for 45 minutes including: pelvic floor relaxation/bulging training and 360 breathing     Diaphragmatic breathing review + pelvic floor muscle mobility    In supine, sitting   Pelvic floor muscle elevators    Emphasis on complete relaxation    + hip internal rotation, + single knee to chest     Owen participated in dynamic functional therapeutic activities to improve functional performance for 00 minutes, including:    Home Exercises Provided and Patient Education Provided     Education provided:   - anatomy/physiology of pelvic floor, posture/body mechanices, diaphragmatic breathing, and behavior modifications  Discussed progression of plan of care with patient; educated pt in activity modification; reviewed HEP with pt. Pt demonstrated and verbalized understanding of all instruction and was provided with a handout of HEP (see Patient Instructions).    Written Home Exercises Provided: Patient instructed to cont prior HEP.  Exercises were reviewed and Owen was able to demonstrate them prior to the end of the session.  Owen demonstrated good  understanding of the education provided.     See EMR under Patient Instructions for exercises provided 8/1/2023.    Assessment     Pelvic floor muscle assessment revealed functional weakness, with slow/limited relaxation noted prior to and following recruitment. Following relaxation training, slight improvement in pelvic floor muscle recruitment noted, so elevators introduced. Despite this, Owen requires additional relaxation breaths to assist with complete relaxation. Based on performance, plan to reassess performance and utility next visit.      Owen Is progressing well towards her goals.   Pt prognosis is Good.     Pt will continue to benefit from skilled outpatient  physical therapy to address the deficits listed in the problem list box on initial evaluation, provide pt/family education and to maximize pt's level of independence in the home and community environment.     Pt's spiritual, cultural and educational needs considered and pt agreeable to plan of care and goals.     Anticipated barriers to physical therapy: scheduling limitations, chronicity of condition    Goals:   Short Term Goals: 6 weeks - progressing  Pt to be edu pelvic muscle bracing and be able to consistently perform correctly and quickly to help decrease incontinence with cough/laugh/sneeze.  Pt to demonstrate being able to correctly and consistently perform a kegel which is needed  to increase pelvic floor muscle coordination and strength needed for continence.  Pt to report being able to transfer from sitting to standing without leakage of urine.  Pt to voice understanding of the role that diet plays on urinary urgency.    Pt to demonstrate proper diaphragmatic breathing to help with calming the nervous system in order to decrease pain and to improve abdominal wall musculature extensibility.   Pt to report being able to complete a half day at work without significant increase in pain to demonstrate a return towards prior level of function.  Pt to report minimal pain with palpation of abdominal wall due to improvement in soft tissue mobility from moderate to minimal restrictions.  Pt to demonstrate a decrease in scar restrictions to no more than minimal restrictions needed to help decrease pain with functional mobility.  Pt will report minimal to no pain with single digit pelvic assessment and display relaxation demonstrating improving pelvic floor muscle relaxation and coordination.      Long Term Goals: 12 weeks - progressing  Pt to be discharged with home plan for carry over after discharge.    Pt to report elimination of incontinence with ADLs to demonstrate improved pelvic floor muscle strength and  coordination.  Pt will be trained and compliant with postural strategies in sitting and standing to improve alignment and decrease pain and muscle fatigue  Pt to report being able to complete a full day at work without significant increase in pain to demonstrate a return towards prior level of function.  Pt to report being able to have a comfortable vaginal exam without significant increase in pelvic pain.  Pt to increase abdominal wall strength by at least 1 muscle grade to improve lumbopelvic stability with ADLs that would normally increase pain.  Pt to report a decrease in pain to no more than 1/10 at it's worst with vaginal intercourse.  Pt to report being able to sneeze without incontinence demonstrating improved pelvic floor strength and coordination to improve confidence in social situations.   Pt to report being able to have a spontaneous bowel movement at least once every 2 days to demonstrate improving gut motility and pelvic floor coordination.     Plan     Plan for next visit: FOTO  Reassess pelvic floor muscle     ELIZABETH GARCIA, PT   10/18/2023

## 2023-10-19 NOTE — PATIENT INSTRUCTIONS
([email or web address])    Initially it may be useful to practice squeezing at different layers of your pelvic floor muscles to help you find them, as each layer plays an important role in pelvic floor function. Eventually you want to be doing your kegel contractions to include squeezing at all 3 layers, and this should become one smooth movement.    To isolate layer 1: think about closing your openings (around vagina and anus) and nodding the clitoris    To isolate layer 2: imagine a drawstring in your urethra that you are pulling up inside or that you are telescoping the urethra in on itself.     To isolate layer 3: pull your tailbone up and forward towards your pubic bone     When putting it all together, it can be helpful to think about closing your openings and lifting up and in.      So when practicing this at home:   1. Inhale and let the muscles relax   2. Exhale and perform a kegel (closing your openings and lifting up and in)   3. Hold for about 2-3 seconds (usually about the length of an exhale)  4. Inhale and imagine filling the pelvis up with air as the pelvic floor muscles expand/stretch.   5. Exhale and rest muscles. Make sure the muscles are dropped away fully before repeating

## 2024-02-15 ENCOUNTER — PATIENT MESSAGE (OUTPATIENT)
Dept: DERMATOLOGY | Facility: CLINIC | Age: 29
End: 2024-02-15
Payer: OTHER GOVERNMENT

## 2024-02-26 ENCOUNTER — OFFICE VISIT (OUTPATIENT)
Dept: DERMATOLOGY | Facility: CLINIC | Age: 29
End: 2024-02-26
Payer: OTHER GOVERNMENT

## 2024-02-26 DIAGNOSIS — B35.3 TINEA PEDIS, UNSPECIFIED LATERALITY: ICD-10-CM

## 2024-02-26 DIAGNOSIS — L21.9 SEBORRHEIC DERMATITIS: Primary | ICD-10-CM

## 2024-02-26 PROCEDURE — 99999 PR PBB SHADOW E&M-EST. PATIENT-LVL II: CPT | Mod: PBBFAC,,, | Performed by: STUDENT IN AN ORGANIZED HEALTH CARE EDUCATION/TRAINING PROGRAM

## 2024-02-26 PROCEDURE — 99204 OFFICE O/P NEW MOD 45 MIN: CPT | Mod: S$PBB,,, | Performed by: STUDENT IN AN ORGANIZED HEALTH CARE EDUCATION/TRAINING PROGRAM

## 2024-02-26 PROCEDURE — 99212 OFFICE O/P EST SF 10 MIN: CPT | Mod: PBBFAC | Performed by: STUDENT IN AN ORGANIZED HEALTH CARE EDUCATION/TRAINING PROGRAM

## 2024-02-26 RX ORDER — MOMETASONE FUROATE 1 MG/ML
SOLUTION TOPICAL
Qty: 60 ML | Refills: 5 | Status: SHIPPED | OUTPATIENT
Start: 2024-02-26

## 2024-02-26 RX ORDER — KETOCONAZOLE 20 MG/G
CREAM TOPICAL
Qty: 30 G | Refills: 2 | Status: SHIPPED | OUTPATIENT
Start: 2024-02-26

## 2024-02-26 RX ORDER — KETOCONAZOLE 20 MG/ML
SHAMPOO, SUSPENSION TOPICAL
Qty: 120 ML | Refills: 6 | Status: SHIPPED | OUTPATIENT
Start: 2024-02-26

## 2024-02-26 NOTE — PROGRESS NOTES
Subjective:      Patient ID:  Owen Muñiz is a 28 y.o. female who presents for   Chief Complaint   Patient presents with    Hair/Scalp Problem    Lesion     R foot     28 y.o. female presents today for scalp issues and rash on foot    New patient    1. Seborrheic dermatitis  Location: scalp  Duration: 1 yr  Symptoms: dandruff packed in hair  Current treatments: none  Prior treatments tried:  Other pertinent history:    2. Rash  Location: top of R foot  Duration: 4-5 mos  Symptoms: very dry  Relieving factors/Previous treatments: eucerin lotion (did not help)          Review of Systems   Constitutional:  Negative for fever, chills, fatigue and malaise.   Respiratory:  Negative for cough.    Skin:  Positive for dry skin. Negative for itching and rash.   Hematologic/Lymphatic: Negative for adenopathy.   Allergic/Immunologic: Positive for environmental allergies.       Objective:   Physical Exam   Skin:   Areas Examined (abnormalities noted in diagram):   Scalp / Hair Palpated and Inspected  RLE Inspected       Diagram Legend     Erythematous scaling macule/papule c/w actinic keratosis       Vascular papule c/w angioma      Pigmented verrucoid papule/plaque c/w seborrheic keratosis      Yellow umbilicated papule c/w sebaceous hyperplasia      Irregularly shaped tan macule c/w lentigo     1-2 mm smooth white papules consistent with Milia      Movable subcutaneous cyst with punctum c/w epidermal inclusion cyst      Subcutaneous movable cyst c/w pilar cyst      Firm pink to brown papule c/w dermatofibroma      Pedunculated fleshy papule(s) c/w skin tag(s)      Evenly pigmented macule c/w junctional nevus     Mildly variegated pigmented, slightly irregular-bordered macule c/w mildly atypical nevus      Flesh colored to evenly pigmented papule c/w intradermal nevus       Pink pearly papule/plaque c/w basal cell carcinoma      Erythematous hyperkeratotic cursted plaque c/w SCC      Surgical scar with no sign of  skin cancer recurrence      Open and closed comedones      Inflammatory papules and pustules      Verrucoid papule consistent consistent with wart     Erythematous eczematous patches and plaques     Dystrophic onycholytic nail with subungual debris c/w onychomycosis     Umbilicated papule    Erythematous-base heme-crusted tan verrucoid plaque consistent with inflamed seborrheic keratosis     Erythematous Silvery Scaling Plaque c/w Psoriasis     See annotation      Assessment / Plan:        Seborrheic dermatitis  Status: chronic condition flaring and/or not at treatment goal  Moderate of scalp, diffuse flaking   Reviewed natural hx and etiology of condition  Start mometasone soln 1-2 times daily as needed  Ketoconazole shampoo at least once weekly and can increase frequency of washing for flares- can dry hair can use regular shampoo  Can alternate with Head and shoulders or selsun blue    Tinea pedis, unspecified laterality  Annular flaking patch dorsal feet  -     ketoconazole (NIZORAL) 2 % cream; Apply twice daily to affected area of feet twice daily for 3 weeks. Stop when clear and restart as needed  Dispense: 30 g; Refill: 2    RTC prn if condition worsens or fails to improve

## 2025-01-06 DIAGNOSIS — Z87.59 PERSONAL HISTORY OF OTHER COMPLICATIONS OF PREGNANCY, CHILDBIRTH AND THE PUERPERIUM: Primary | ICD-10-CM

## 2025-01-23 PROBLEM — Z87.59 HISTORY OF SEVERE PRE-ECLAMPSIA: Status: ACTIVE | Noted: 2025-01-23

## 2025-01-23 PROBLEM — I10 CHRONIC HYPERTENSION: Status: ACTIVE | Noted: 2025-01-23

## 2025-01-23 PROBLEM — Z31.69 ENCOUNTER FOR PRECONCEPTION CONSULTATION: Status: ACTIVE | Noted: 2025-01-23

## 2025-02-05 PROBLEM — Z87.59 HISTORY OF IUFD: Status: ACTIVE | Noted: 2025-02-05

## 2025-02-05 NOTE — ASSESSMENT & PLAN NOTE
History of severe pree requiring delivery at 30 weeks in G2 pregnancy.   Recommend  mg qD starting at 12 weeks in subsequent pregnancies.

## 2025-02-05 NOTE — ASSESSMENT & PLAN NOTE
Reviewed importance of optimal control of cHTN entering into pregnancy. Reviewed pregnancy specific targets of <140/90 as well as preferred medications in pregnancy (labetalol and nifedipine).     As patient did not feel well on Procardia 60 recommended patient follows with primary provider to adjust antihypertensive regimen (ie either attempting Procardia 60 again or changing to a different agent such as labetalol) to ensure optimal control prior to pregnancy.

## 2025-02-05 NOTE — ASSESSMENT & PLAN NOTE
We reviewed Ms. Muñiz's history of an IUFD at 26 weeks. As above in HPI, this was attributed to presumed Trisomy 21 given positive cfDNA, fetal anomalies, and appearance of infant following delivery. She did not further evaluation postpartum.     We discussed that the risk for recurrent stillbirth is overall low but depends on the underlying etiology. If the cause of fetal death in a low-risk individual was not discovered, the risk of recurrence is estimated to be 7.8 to 10.5 per 1000 births.  The subsequent pregnancy is also at risk for abruption,  birth, and fetal growth restriction.  I counseled the patient regarding management during this pregnancy including the recommendation for serial growth ultrasounds, fetal echocardiogram (given prior fetal cardiac anomalies), antepartum testing (and the limitations associated with this), and delivery timing recommendations - although these will likely be driven by cHTN diagnosis.     Recommendations:  Optimization of maternal medical conditions (blood pressure control, smoking cessation, etc)  Ensure evaluation and work-up has been completed: (APS testing, T&S, RPR, and screening for maternal diabetes (A1C or 1 hour glucose screen if appropriate))  An inherited thrombophilia panel is not indicated in the work-up of stillbirth or recurrent pregnancy loss  Referral to genetics if concern for familial genetic condition  Offer aneuploidy screening or prenatal diagnosis, as appropriate  Refer for detailed anatomy survey with MFM at 18-20 weeks  Serial fetal growth ultrasounds every 4-6 weeks, starting at 26-28 weeks  Fetal movement awareness, starting at 27-28 weeks  Initiate twice weekly antepartum surveillance at 32 weeks (or 2 weeks prior to gestational age at time of prior stillbirth, but not < 28 weeks)  Testing should be a weekly NST and weekly BPP  Delivery at 38 0/7 - 38 6/7 weeks gestation, unless indicated earlier by maternal or obstetric condition (e.g.  hypertension, etc)  Delivery may be individualized and considered as early as 37 0/7 weeks, in patients with significant maternal anxiety who are well-counseled by MFM regarding the potential risks of early term delivery   Close monitoring for peripartum mood disorders

## 2025-02-05 NOTE — ASSESSMENT & PLAN NOTE
Patient presents for preconception consultation due to history of IUFD at 26 weeks and history of pre-eclampsia requiring  delivery at 30 weeks. See history above in HPI.     General Preconception Recommendations:  Optimization of the patient's comorbid conditions  Avoidance of drug use/alcohol/tobacco  Elimination, if possible, of any unsafe medications  Daily prenatal vitamin  Daily folic acid supplement (1mg)  Ensure vaccinations are up to date (defer conception for 1 month after MMR and varicella vaccine)  Consideration of genetic carrier screening (hemoglobinopathy, cystic fibrosis, and SMA screening) and discussion of expanded carrier screening as appropriate  Laboratory evaluation including: Rubella, Varicella, Hepatitis B surface antigen, CBC, HIV.  If the patient is Rh-negative, blood type determination of her partner.    In regards to patient's specific history, recommend that APLS labs be completed (lupus anticoagulant, beta 2 glycoprotein Ab, and anticardiolipin Ab).

## 2025-02-06 ENCOUNTER — OFFICE VISIT (OUTPATIENT)
Dept: MATERNAL FETAL MEDICINE | Facility: CLINIC | Age: 30
End: 2025-02-06
Payer: OTHER GOVERNMENT

## 2025-02-06 VITALS
BODY MASS INDEX: 28.45 KG/M2 | SYSTOLIC BLOOD PRESSURE: 142 MMHG | DIASTOLIC BLOOD PRESSURE: 94 MMHG | WEIGHT: 144.94 LBS | HEIGHT: 60 IN

## 2025-02-06 DIAGNOSIS — Z87.59 HISTORY OF SEVERE PRE-ECLAMPSIA: ICD-10-CM

## 2025-02-06 DIAGNOSIS — Z31.69 ENCOUNTER FOR PRECONCEPTION CONSULTATION: Primary | ICD-10-CM

## 2025-02-06 DIAGNOSIS — Z87.59 HISTORY OF IUFD: ICD-10-CM

## 2025-02-06 DIAGNOSIS — Z87.59 PERSONAL HISTORY OF OTHER COMPLICATIONS OF PREGNANCY, CHILDBIRTH AND THE PUERPERIUM: ICD-10-CM

## 2025-02-06 DIAGNOSIS — I10 CHRONIC HYPERTENSION: ICD-10-CM

## 2025-02-06 PROCEDURE — 99204 OFFICE O/P NEW MOD 45 MIN: CPT | Mod: S$PBB,,, | Performed by: STUDENT IN AN ORGANIZED HEALTH CARE EDUCATION/TRAINING PROGRAM

## 2025-02-06 PROCEDURE — 99999 PR PBB SHADOW E&M-EST. PATIENT-LVL III: CPT | Mod: PBBFAC,,, | Performed by: STUDENT IN AN ORGANIZED HEALTH CARE EDUCATION/TRAINING PROGRAM

## 2025-02-06 PROCEDURE — 99213 OFFICE O/P EST LOW 20 MIN: CPT | Mod: PBBFAC | Performed by: STUDENT IN AN ORGANIZED HEALTH CARE EDUCATION/TRAINING PROGRAM

## 2025-02-06 RX ORDER — NIFEDIPINE 30 MG/1
30 TABLET, EXTENDED RELEASE ORAL DAILY
COMMUNITY

## 2025-02-06 NOTE — PROGRESS NOTES
Maternal Fetal Medicine consult    SUBJECTIVE:     Owen Muñiz is a 29 y.o.  female who is seen in consultation by [facility] for evaluation and management of:  Problem   History of Iufd   Chronic Hypertension   History of Severe Pre-Eclampsia   Encounter for Preconception Consultation       OB HX:  G1: Stillbirth at 26wga. Patient reports cfDNA consistent with trisomy 21. The fetus then had multiple anomalies on ultrasound, including lagging long bones, echogenic bowel, and cardiac anomalies (reported as multiple holes on the heart). At her 26 week appointment she was diagnosed with stillbirth and underwent IOL resulting in . Patient reports infants appearance was consistent with Trisomy 21. She did not have any further evaluation for the stillbirth (ie APLS labs, autopsy, placental pathology, genetic evaluation) as it was assumed to be related to the trisomy 21.    G2: cHTN w/ ANTELMO w/ SF. Patient reports she was started on Procardia for cHTN between her G1 and G2 pregnancies. At 29w5d she was admitted for ANTELMO with severely elevated BP, denies any lab abnormalities. She received MgSO4. At 30w1d she had a BPP and reports the fetus was not moving or breathing and she underwent urgent  delivery (reports low transverse ). Her postpartum course was uncomplicated.    G3: 1st trimester SAB, expectantly managed     Medical History:  cHTN: diagnosed between G1 and G2 pregnancies as above. Started on Procardia 30. Attempted to uptitrate to Procardia 60 but reports feeling poorly with this and dose was decreased again to Procardia 30. She occasionally checks her BP at home and at other appointments with readings in the 130s/90s.     Denies other chronic medical conditions. Does not take other medications.     Past Medical History:   Diagnosis Date    Chronic hypertension 2025     Past Surgical History:   Procedure Laterality Date    EXTERNAL EAR SURGERY      bilateral ext ear canal  impacted hair (intentional)    right ear surgery       Family history: negative for birth defects, recurrent miscarriages, chromosomal abnormalities.   Social History     Tobacco Use    Smoking status: Some Days     Types: Vaping with nicotine   Substance Use Topics    Alcohol use: Yes     Comment: ocassionally    Drug use: No     Review of patient's allergies indicates:   Allergen Reactions    No known drug allergies      Medications:  Procardia 30  PNV (prescribed but has only taken rarely)    Records reviewed    OBJECTIVE:     Blood Pressure: 142/94    Physical Exam:  Gen: No apparent distress  Pulm: Normal work of breathing       ASSESSMENT/PLAN:     29 y.o.  female presenting for:     Encounter for preconception consultation  Patient presents for preconception consultation due to history of IUFD at 26 weeks and history of pre-eclampsia requiring  delivery at 30 weeks. See history above in HPI.     General Preconception Recommendations:  Optimization of the patient's comorbid conditions  Avoidance of drug use/alcohol/tobacco  Elimination, if possible, of any unsafe medications  Daily prenatal vitamin  Daily folic acid supplement (1mg)  Ensure vaccinations are up to date (defer conception for 1 month after MMR and varicella vaccine)  Consideration of genetic carrier screening (hemoglobinopathy, cystic fibrosis, and SMA screening) and discussion of expanded carrier screening as appropriate  Laboratory evaluation including: Rubella, Varicella, Hepatitis B surface antigen, CBC, HIV.  If the patient is Rh-negative, blood type determination of her partner.    In regards to patient's specific history, recommend that APLS labs be completed (lupus anticoagulant, beta 2 glycoprotein Ab, and anticardiolipin Ab).     Chronic hypertension  Reviewed importance of optimal control of cHTN entering into pregnancy. Reviewed pregnancy specific targets of <140/90 as well as preferred medications in pregnancy (labetalol  and nifedipine).     As patient did not feel well on Procardia 60 recommended patient follows with primary provider to adjust antihypertensive regimen (ie either attempting Procardia 60 again or changing to a different agent such as labetalol) to ensure optimal control prior to pregnancy.     History of severe pre-eclampsia  History of severe pree requiring delivery at 30 weeks in G2 pregnancy.   Recommend  mg qD starting at 12 weeks in subsequent pregnancies.     History of IUFD  We reviewed Ms. Muñiz's history of an IUFD at 26 weeks. As above in HPI, this was attributed to presumed Trisomy 21 given positive cfDNA, fetal anomalies, and appearance of infant following delivery. She did not further evaluation postpartum.     We discussed that the risk for recurrent stillbirth is overall low but depends on the underlying etiology. If the cause of fetal death in a low-risk individual was not discovered, the risk of recurrence is estimated to be 7.8 to 10.5 per 1000 births.  The subsequent pregnancy is also at risk for abruption,  birth, and fetal growth restriction.  I counseled the patient regarding management during this pregnancy including the recommendation for serial growth ultrasounds, fetal echocardiogram (given prior fetal cardiac anomalies), antepartum testing (and the limitations associated with this), and delivery timing recommendations - although these will likely be driven by cHTN diagnosis.     Recommendations:  Optimization of maternal medical conditions (blood pressure control, smoking cessation, etc)  Ensure evaluation and work-up has been completed: (APS testing, T&S, RPR, and screening for maternal diabetes (A1C or 1 hour glucose screen if appropriate))  An inherited thrombophilia panel is not indicated in the work-up of stillbirth or recurrent pregnancy loss  Referral to genetics if concern for familial genetic condition  Offer aneuploidy screening or prenatal diagnosis, as  appropriate  Refer for detailed anatomy survey with MFM at 18-20 weeks  Serial fetal growth ultrasounds every 4-6 weeks, starting at 26-28 weeks  Fetal movement awareness, starting at 27-28 weeks  Initiate twice weekly antepartum surveillance at 32 weeks (or 2 weeks prior to gestational age at time of prior stillbirth, but not < 28 weeks)  Testing should be a weekly NST and weekly BPP  Delivery at 38 0/7 - 38 6/7 weeks gestation, unless indicated earlier by maternal or obstetric condition (e.g. hypertension, etc)  Delivery may be individualized and considered as early as 37 0/7 weeks, in patients with significant maternal anxiety who are well-counseled by MFM regarding the potential risks of early term delivery   Close monitoring for peripartum mood disorders      FOLLOW UP:   No further ultrasounds or visits were scheduled. Patient will followup once pregnancy is achieved.    Today I have spent 45 minutes in the care of the patient. This includes face to face time and non-face to face time preparing to see the patient (eg, review of tests), obtaining and/or reviewing separately obtained history, documenting clinical information in the electronic or other health record, independently interpreting results and communicating results to the patient/family/caregiver, or care coordination.     Nishi Shepherd MD  OBGYN, PGY-4

## 2025-02-21 DIAGNOSIS — O09.91 SUPERVISION OF HIGH RISK PREGNANCY, UNSPECIFIED, FIRST TRIMESTER: Primary | ICD-10-CM

## 2025-02-26 ENCOUNTER — PATIENT MESSAGE (OUTPATIENT)
Dept: OBSTETRICS AND GYNECOLOGY | Facility: CLINIC | Age: 30
End: 2025-02-26

## 2025-02-26 ENCOUNTER — CLINICAL SUPPORT (OUTPATIENT)
Dept: OBSTETRICS AND GYNECOLOGY | Facility: CLINIC | Age: 30
End: 2025-02-26
Payer: OTHER GOVERNMENT

## 2025-02-26 DIAGNOSIS — N91.2 AMENORRHEA: Primary | ICD-10-CM

## 2025-02-26 PROCEDURE — 99999 PR PBB SHADOW E&M-EST. PATIENT-LVL II: CPT | Mod: PBBFAC,,,

## 2025-02-26 PROCEDURE — 99212 OFFICE O/P EST SF 10 MIN: CPT | Mod: PBBFAC

## 2025-02-26 RX ORDER — FEXOFENADINE HCL 60 MG/1
60 TABLET, FILM COATED ORAL DAILY
COMMUNITY

## 2025-02-26 NOTE — PROGRESS NOTES
Spoke with patient for a total of 30 minutes during virtual visit.  Updated chart to reflect up to date patient demographics.  Allergies, medications, pharmacy, medical/family history and OB history updated.  Patient was guided through expectations of care during pregnancy.  Pregnancy confirmation, dating u/s & first routine OB appts scheduled.  Education provided & questions answered. Encouraged to send message or call office with any questions/concerns. Verbalized understanding.     Discussed with pt:    Lmp ; unsure of date; had IUD removed in Dec 2024  taking PNV   C/o nausea/no vomiting  C/o mild cramping/no spotting  Precautions discussed  Referred to [email or web address] for Preg A to Z guide & class schedule   Discussed benefits of breastfeeding - plans to breastfeed   Hx  del at 30w1d  Hx stillborn at 26wks due to genetic anomalies per pt; hx SAB x1